# Patient Record
Sex: FEMALE | Race: WHITE | HISPANIC OR LATINO | Employment: FULL TIME | URBAN - METROPOLITAN AREA
[De-identification: names, ages, dates, MRNs, and addresses within clinical notes are randomized per-mention and may not be internally consistent; named-entity substitution may affect disease eponyms.]

---

## 2023-03-18 LAB
CHLAMYDIA TRACH., AMPLIFIED: NEGATIVE
N. GONORRHEA, AMPLIFIED: NEGATIVE

## 2023-03-19 LAB — URINE CULTURE: ABNORMAL

## 2023-04-03 LAB
ABO GROUP (TYPE) IN BLOOD: NORMAL
ANTIBODY SCREEN: NORMAL
ERYTHROCYTE DISTRIBUTION WIDTH (RATIO) BY AUTOMATED COUNT: 13 % (ref 11.5–14.5)
ERYTHROCYTE MEAN CORPUSCULAR HEMOGLOBIN CONCENTRATION (G/DL) BY AUTOMATED: 33 G/DL (ref 32–36)
ERYTHROCYTE MEAN CORPUSCULAR VOLUME (FL) BY AUTOMATED COUNT: 90 FL (ref 80–100)
ERYTHROCYTES (10*6/UL) IN BLOOD BY AUTOMATED COUNT: 4.19 X10E12/L (ref 4–5.2)
ESTIMATED AVERAGE GLUCOSE FOR HBA1C: 94 MG/DL
HEMATOCRIT (%) IN BLOOD BY AUTOMATED COUNT: 37.6 % (ref 36–46)
HEMOGLOBIN (G/DL) IN BLOOD: 12.4 G/DL (ref 12–16)
HEMOGLOBIN A1C/HEMOGLOBIN TOTAL IN BLOOD: 4.9 %
HEPATITIS B VIRUS SURFACE AG PRESENCE IN SERUM: NONREACTIVE
HEPATITIS C VIRUS AB PRESENCE IN SERUM: NONREACTIVE
HIV 1/ 2 AG/AB SCREEN: NONREACTIVE
LEUKOCYTES (10*3/UL) IN BLOOD BY AUTOMATED COUNT: 6.7 X10E9/L (ref 4.4–11.3)
NRBC (PER 100 WBCS) BY AUTOMATED COUNT: 0 /100 WBC (ref 0–0)
PLATELETS (10*3/UL) IN BLOOD AUTOMATED COUNT: 234 X10E9/L (ref 150–450)
REFLEX ADDED, ANEMIA PANEL: NORMAL
RH FACTOR: NORMAL
RUBELLA VIRUS IGG AB: POSITIVE
SYPHILIS TOTAL AB: NONREACTIVE

## 2023-04-05 LAB
HEMOGLOBIN A2: 2.9 %
HEMOGLOBIN A: 96.5 %
HEMOGLOBIN F: 0.6 %
HEMOGLOBIN IDENTIFICATION INTERPRETATION: NORMAL
PATH REVIEW-HGB IDENTIFICATION: NORMAL

## 2023-04-23 LAB — URINE CULTURE: NORMAL

## 2023-08-29 ENCOUNTER — LAB (OUTPATIENT)
Dept: LAB | Facility: LAB | Age: 28
End: 2023-08-29
Payer: COMMERCIAL

## 2023-08-29 LAB
ERYTHROCYTE DISTRIBUTION WIDTH (RATIO) BY AUTOMATED COUNT: 13.1 % (ref 11.5–14.5)
ERYTHROCYTE MEAN CORPUSCULAR HEMOGLOBIN CONCENTRATION (G/DL) BY AUTOMATED: 32 G/DL (ref 32–36)
ERYTHROCYTE MEAN CORPUSCULAR VOLUME (FL) BY AUTOMATED COUNT: 90 FL (ref 80–100)
ERYTHROCYTES (10*6/UL) IN BLOOD BY AUTOMATED COUNT: 3.54 X10E12/L (ref 4–5.2)
FERRITIN, PREGNANCY: 24 UG/L
FOLATE, SERUM, PREGNANCY: 14.6 NG/ML
GLUCOSE, 1 HR SCREEN, PREG: 139 MG/DL
HEMATOCRIT (%) IN BLOOD BY AUTOMATED COUNT: 31.9 % (ref 36–46)
HEMOGLOBIN (G/DL) IN BLOOD: 10.2 G/DL (ref 12–16)
IRON (UG/DL) IN SER/PLAS IN PREGNANCY: 41 UG/DL
IRON BINDING CAPACITY (UG/DL) IN PREGNANCY: 360 UG/DL
IRON SATURATION (%) IN PREGNANCY: 11 %
LEUKOCYTES (10*3/UL) IN BLOOD BY AUTOMATED COUNT: 10.4 X10E9/L (ref 4.4–11.3)
NRBC (PER 100 WBCS) BY AUTOMATED COUNT: 0 /100 WBC (ref 0–0)
PLATELETS (10*3/UL) IN BLOOD AUTOMATED COUNT: 226 X10E9/L (ref 150–450)
REFLEX ADDED, ANEMIA PANEL: ABNORMAL
SYPHILIS TOTAL AB: NONREACTIVE
VITAMIN B12, PREGNANCY: 274 PG/ML

## 2023-09-08 LAB
GLUCOSE THREE HOUR: 93 MG/DL
GLUCOSE TWO HOUR: 127 MG/DL
GLUCOSE, FASTING: 67 MG/DL
GLUCOSE, ONE HOUR: 145 MG/DL
GTTCM: NORMAL

## 2023-09-26 LAB
ERYTHROCYTE DISTRIBUTION WIDTH (RATIO) BY AUTOMATED COUNT: 14 % (ref 11.5–14.5)
ERYTHROCYTE MEAN CORPUSCULAR HEMOGLOBIN CONCENTRATION (G/DL) BY AUTOMATED: 32 G/DL (ref 32–36)
ERYTHROCYTE MEAN CORPUSCULAR VOLUME (FL) BY AUTOMATED COUNT: 92 FL (ref 80–100)
ERYTHROCYTES (10*6/UL) IN BLOOD BY AUTOMATED COUNT: 3.56 X10E12/L (ref 4–5.2)
FOLATE, SERUM, PREGNANCY: >24 NG/ML
HEMATOCRIT (%) IN BLOOD BY AUTOMATED COUNT: 32.8 % (ref 36–46)
HEMOGLOBIN (G/DL) IN BLOOD: 10.5 G/DL (ref 12–16)
HEMOGLOBIN (PG) IN RETICULOCYTES: 32 PG (ref 28–38)
IMMATURE RETIC FRACTION: 16.7 % (ref 0–16)
IRON (UG/DL) IN SER/PLAS IN PREGNANCY: 76 UG/DL
IRON BINDING CAPACITY (UG/DL) IN PREGNANCY: 388 UG/DL
IRON SATURATION (%) IN PREGNANCY: 20 %
LEUKOCYTES (10*3/UL) IN BLOOD BY AUTOMATED COUNT: 8.5 X10E9/L (ref 4.4–11.3)
NRBC (PER 100 WBCS) BY AUTOMATED COUNT: 0 /100 WBC (ref 0–0)
PLATELETS (10*3/UL) IN BLOOD AUTOMATED COUNT: 219 X10E9/L (ref 150–450)
REFLEX ADDED, ANEMIA PANEL: ABNORMAL
RETICULOCYTES (10*3/UL) IN BLOOD: 0.1 X10E12/L (ref 0.02–0.08)
RETICULOCYTES/100 ERYTHROCYTES IN BLOOD BY AUTOMATED COUNT: 2.8 % (ref 0.5–2)
VITAMIN B12, PREGNANCY: 366 PG/ML

## 2023-09-27 PROBLEM — R06.00 DYSPNEA: Status: ACTIVE | Noted: 2023-09-27

## 2023-09-27 PROBLEM — R12 HEARTBURN IN PREGNANCY (HHS-HCC): Status: ACTIVE | Noted: 2023-09-27

## 2023-09-27 PROBLEM — O99.013 ANEMIA DURING PREGNANCY IN THIRD TRIMESTER (HHS-HCC): Status: ACTIVE | Noted: 2023-09-27

## 2023-09-27 PROBLEM — F41.1 ANXIETY, GENERALIZED: Status: ACTIVE | Noted: 2023-09-27

## 2023-09-27 PROBLEM — Z34.03 PRIMIGRAVIDA, THIRD TRIMESTER (HHS-HCC): Status: ACTIVE | Noted: 2023-09-27

## 2023-09-27 PROBLEM — E66.9 CLASS 2 OBESITY WITHOUT SERIOUS COMORBIDITY WITH BODY MASS INDEX (BMI) OF 36.0 TO 36.9 IN ADULT: Status: ACTIVE | Noted: 2023-09-27

## 2023-09-27 PROBLEM — S06.0XAA BRAIN CONCUSSION: Status: ACTIVE | Noted: 2023-09-27

## 2023-09-27 PROBLEM — E66.812 CLASS 2 OBESITY WITHOUT SERIOUS COMORBIDITY WITH BODY MASS INDEX (BMI) OF 36.0 TO 36.9 IN ADULT: Status: ACTIVE | Noted: 2023-09-27

## 2023-09-27 PROBLEM — O26.899 HEARTBURN IN PREGNANCY (HHS-HCC): Status: ACTIVE | Noted: 2023-09-27

## 2023-09-27 PROBLEM — O21.9 NAUSEA AND VOMITING DURING PREGNANCY (HHS-HCC): Status: ACTIVE | Noted: 2023-09-27

## 2023-09-27 LAB
FERRITIN (UG/LL) IN SER/PLAS: 27 UG/L (ref 8–150)
FERRITIN, PREGNANCY: 27 UG/L

## 2023-09-27 RX ORDER — FOLIC ACID, .BETA.-CAROTENE, ASCORBIC ACID, CHOLECALCIFEROL, .ALPHA.-TOCOPHEROL ACETATE, DL-, THIAMINE MONONITRATE, RIBOFLAVIN, NIACINAMIDE, PYRIDOXINE HYDROCHLORIDE, CYANOCOBALAMIN, CALCIUM PANTOTHENATE, CALCIUM CARBONATE, FERROUS FUMARATE, AND ZINC OXIDE 1; 1000; 100; 400; 30; 3; 3; 15; 20; 12; 7; 200; 29; 20 MG/1; [IU]/1; MG/1; [IU]/1; [IU]/1; MG/1; MG/1; MG/1; MG/1; UG/1; MG/1; MG/1; MG/1; MG/1
1 TABLET, CHEWABLE ORAL DAILY
COMMUNITY
Start: 2023-03-17

## 2023-09-27 RX ORDER — DROSPIRENONE AND ETHINYL ESTRADIOL 0.02-3(28)
1 KIT ORAL DAILY
Status: ON HOLD | COMMUNITY
Start: 2021-06-17 | End: 2023-10-13 | Stop reason: ALTCHOICE

## 2023-09-27 RX ORDER — METOCLOPRAMIDE 5 MG/1
1 TABLET ORAL 3 TIMES DAILY PRN
Status: ON HOLD | COMMUNITY
End: 2023-10-13 | Stop reason: ALTCHOICE

## 2023-09-27 RX ORDER — BUTALBITAL, ACETAMINOPHEN AND CAFFEINE 300; 40; 50 MG/1; MG/1; MG/1
1 CAPSULE ORAL EVERY 4 HOURS PRN
Status: ON HOLD | COMMUNITY
Start: 2016-02-06 | End: 2023-10-13 | Stop reason: ALTCHOICE

## 2023-09-27 RX ORDER — ALBUTEROL SULFATE 90 UG/1
1-2 AEROSOL, METERED RESPIRATORY (INHALATION)
COMMUNITY
Start: 2020-12-23

## 2023-09-27 RX ORDER — FAMOTIDINE 20 MG/1
1 TABLET, FILM COATED ORAL NIGHTLY
Status: ON HOLD | COMMUNITY
Start: 2023-08-29 | End: 2023-10-13 | Stop reason: ALTCHOICE

## 2023-09-27 RX ORDER — CALCIUM CARBONATE 300MG(750)
1 TABLET,CHEWABLE ORAL DAILY
Status: ON HOLD | COMMUNITY
Start: 2021-10-07 | End: 2023-10-13 | Stop reason: ALTCHOICE

## 2023-09-27 RX ORDER — MUPIROCIN 20 MG/G
OINTMENT TOPICAL
Status: ON HOLD | COMMUNITY
Start: 2020-09-15 | End: 2023-10-13 | Stop reason: ALTCHOICE

## 2023-09-27 RX ORDER — FLUOXETINE HYDROCHLORIDE 20 MG/1
20 CAPSULE ORAL DAILY
COMMUNITY

## 2023-09-27 RX ORDER — FLUOXETINE 10 MG/1
1 TABLET ORAL DAILY
Status: ON HOLD | COMMUNITY
Start: 2023-04-10 | End: 2023-10-13 | Stop reason: ALTCHOICE

## 2023-09-28 LAB — GROUP B STREP SCREEN: NORMAL

## 2023-10-02 ENCOUNTER — ROUTINE PRENATAL (OUTPATIENT)
Dept: OBSTETRICS AND GYNECOLOGY | Facility: CLINIC | Age: 28
End: 2023-10-02
Payer: COMMERCIAL

## 2023-10-02 VITALS — SYSTOLIC BLOOD PRESSURE: 112 MMHG | WEIGHT: 226 LBS | BODY MASS INDEX: 42.35 KG/M2 | DIASTOLIC BLOOD PRESSURE: 64 MMHG

## 2023-10-02 DIAGNOSIS — Z3A.36 36 WEEKS GESTATION OF PREGNANCY (HHS-HCC): Primary | ICD-10-CM

## 2023-10-02 DIAGNOSIS — O99.013 ANEMIA DURING PREGNANCY IN THIRD TRIMESTER (HHS-HCC): ICD-10-CM

## 2023-10-02 DIAGNOSIS — F41.1 ANXIETY, GENERALIZED: ICD-10-CM

## 2023-10-02 PROCEDURE — 0501F PRENATAL FLOW SHEET: CPT | Performed by: ADVANCED PRACTICE MIDWIFE

## 2023-10-02 NOTE — PROGRESS NOTES
Assessment/Plan   Diagnoses and all orders for this visit:  36 weeks gestation of pregnancy  Obesity complicating childbirth  Anemia during pregnancy in third trimester  Anxiety, generalized    Discussed routine GBS screening, to be completed next visit   surveillance weekly for Obesity Class II.  Growth US tomorrow, will have NST next week.   Reviewed s/sx of PTL, warning signs, fetal movement counts, and when to call provider  Reviewed neg GBS  Follow up in 1 week for a routine prenatal visit.    Fidelia Diggs, JUAN-CNM, APRN-CNP    Gavino     Amy Hogue is a 27 y.o.  at 36w6d with a working estimated date of delivery of 10/24/2023, by Last Menstrual Period who presents for a routine prenatal visit. She denies vaginal bleeding, leakage of fluid, decreased fetal movements, or contractions.    Her pregnancy is complicated by:  Pregnancy Problems (from 23 to present)       No problems associated with this episode.             Objective   Physical Exam:   Weight: 103 kg (226 lb)  Expected Total Weight Gain: Could not be calculated   Pregravid BMI: Could not be calculated  BP: 112/64  Fetal Heart Rate: 146 Fundal Height (cm): 37 cm Presentation: Vertex           Postpartum Depression: Not on file        Prenatal Labs  Lab Results   Component Value Date    HGB 10.5 (L) 2023    HCT 32.8 (L) 2023    HEPBSAG NONREACTIVE 2023

## 2023-10-03 ENCOUNTER — ANCILLARY PROCEDURE (OUTPATIENT)
Dept: RADIOLOGY | Facility: CLINIC | Age: 28
End: 2023-10-03
Payer: COMMERCIAL

## 2023-10-03 ENCOUNTER — APPOINTMENT (OUTPATIENT)
Dept: RADIOLOGY | Facility: CLINIC | Age: 28
End: 2023-10-03
Payer: COMMERCIAL

## 2023-10-03 DIAGNOSIS — Z32.01 PREGNANCY TEST POSITIVE (HHS-HCC): ICD-10-CM

## 2023-10-03 PROCEDURE — 76819 FETAL BIOPHYS PROFIL W/O NST: CPT | Performed by: OBSTETRICS & GYNECOLOGY

## 2023-10-03 PROCEDURE — 76819 FETAL BIOPHYS PROFIL W/O NST: CPT

## 2023-10-03 PROCEDURE — 76816 OB US FOLLOW-UP PER FETUS: CPT

## 2023-10-03 PROCEDURE — 76816 OB US FOLLOW-UP PER FETUS: CPT | Performed by: OBSTETRICS & GYNECOLOGY

## 2023-10-09 ENCOUNTER — ROUTINE PRENATAL (OUTPATIENT)
Dept: OBSTETRICS AND GYNECOLOGY | Facility: CLINIC | Age: 28
End: 2023-10-09
Payer: COMMERCIAL

## 2023-10-09 VITALS — SYSTOLIC BLOOD PRESSURE: 118 MMHG | DIASTOLIC BLOOD PRESSURE: 70 MMHG | WEIGHT: 225 LBS | BODY MASS INDEX: 42.17 KG/M2

## 2023-10-09 DIAGNOSIS — O99.013 ANEMIA DURING PREGNANCY IN THIRD TRIMESTER (HHS-HCC): ICD-10-CM

## 2023-10-09 DIAGNOSIS — Z34.03 PRIMIGRAVIDA, THIRD TRIMESTER (HHS-HCC): Primary | ICD-10-CM

## 2023-10-09 DIAGNOSIS — E66.9 OBESITY, CLASS II, BMI 35-39.9: ICD-10-CM

## 2023-10-09 DIAGNOSIS — O24.414 INSULIN CONTROLLED GESTATIONAL DIABETES MELLITUS (GDM) IN THIRD TRIMESTER (HHS-HCC): ICD-10-CM

## 2023-10-09 PROBLEM — S06.0XAA BRAIN CONCUSSION: Status: RESOLVED | Noted: 2023-09-27 | Resolved: 2023-10-09

## 2023-10-09 PROBLEM — R06.00 DYSPNEA: Status: RESOLVED | Noted: 2023-09-27 | Resolved: 2023-10-09

## 2023-10-09 PROCEDURE — 59025 FETAL NON-STRESS TEST: CPT | Performed by: ADVANCED PRACTICE MIDWIFE

## 2023-10-09 PROCEDURE — 0501F PRENATAL FLOW SHEET: CPT | Performed by: ADVANCED PRACTICE MIDWIFE

## 2023-10-09 NOTE — PROCEDURES
Fetal nonstress test    Date/Time: 10/9/2023 4:37 PM    Performed by: ANDRE Nam, APRN-CNP  Authorized by: ANDRE Nam, APRN-CNP    NST for Class II obesity   Baseline 150 moderate variability +accel, -decels   TOCO: quiet  Reactive NST

## 2023-10-09 NOTE — PROGRESS NOTES
Assessment/Plan   Diagnoses and all orders for this visit:  Primigravida, third trimester  -Reviewed neg GBS  Anemia during pregnancy in third trimester  -on PO iron, appropriate increase in hgb and retic   Obesity, Class II, BMI 35-39.9  -NST reactive today, continue weekly  testing      Reviewed s/sx of labor, warning signs, fetal movement counts, and when to call provider  Follow up in 1 week for a routine prenatal visit.    Fidelia Diggs, JUAN-CNM, APRN-CNP    Gavino Reyes Lennie is a 27 y.o.  at 37w6d with a working estimated date of delivery of 10/24/2023, by Last Menstrual Period who presents for a routine prenatal visit. She denies vaginal bleeding, leakage of fluid, decreased fetal movements, or contractions.      Objective   Physical Exam:   Weight: 102 kg (225 lb)  Expected Total Weight Gain: Could not be calculated   Pregravid BMI: Could not be calculated  BP: 118/70                Prenatal Labs  Lab Results   Component Value Date    HGB 10.5 (L) 2023    HCT 32.8 (L) 2023    HEPBSAG NONREACTIVE 2023

## 2023-10-13 ENCOUNTER — ANESTHESIA (OUTPATIENT)
Dept: OBSTETRICS AND GYNECOLOGY | Facility: HOSPITAL | Age: 28
End: 2023-10-13
Payer: COMMERCIAL

## 2023-10-13 ENCOUNTER — ANESTHESIA EVENT (OUTPATIENT)
Dept: OBSTETRICS AND GYNECOLOGY | Facility: HOSPITAL | Age: 28
End: 2023-10-13
Payer: COMMERCIAL

## 2023-10-13 ENCOUNTER — HOSPITAL ENCOUNTER (INPATIENT)
Facility: HOSPITAL | Age: 28
LOS: 3 days | Discharge: HOME | End: 2023-10-16
Attending: STUDENT IN AN ORGANIZED HEALTH CARE EDUCATION/TRAINING PROGRAM | Admitting: ADVANCED PRACTICE MIDWIFE
Payer: COMMERCIAL

## 2023-10-13 PROBLEM — O42.90 RUPTURED, MEMBRANES, PREMATURE (HHS-HCC): Status: ACTIVE | Noted: 2023-10-13

## 2023-10-13 LAB
ABO GROUP (TYPE) IN BLOOD: NORMAL
ANTIBODY SCREEN: NORMAL
ERYTHROCYTE [DISTWIDTH] IN BLOOD BY AUTOMATED COUNT: 13.7 % (ref 11.5–14.5)
HCT VFR BLD AUTO: 33.2 % (ref 36–46)
HGB BLD-MCNC: 10.8 G/DL (ref 12–16)
MCH RBC QN AUTO: 29 PG (ref 26–34)
MCHC RBC AUTO-ENTMCNC: 32.5 G/DL (ref 32–36)
MCV RBC AUTO: 89 FL (ref 80–100)
NRBC BLD-RTO: 0 /100 WBCS (ref 0–0)
PLATELET # BLD AUTO: 203 X10*3/UL (ref 150–450)
PMV BLD AUTO: 10.7 FL (ref 7.5–11.5)
RBC # BLD AUTO: 3.72 X10*6/UL (ref 4–5.2)
RH FACTOR (ANTIGEN D): NORMAL
T PALLIDUM AB SER QL: NONREACTIVE
WBC # BLD AUTO: 8.5 X10*3/UL (ref 4.4–11.3)

## 2023-10-13 PROCEDURE — 86780 TREPONEMA PALLIDUM: CPT | Mod: CMCLAB | Performed by: ADVANCED PRACTICE MIDWIFE

## 2023-10-13 PROCEDURE — 59025 FETAL NON-STRESS TEST: CPT | Performed by: ADVANCED PRACTICE MIDWIFE

## 2023-10-13 PROCEDURE — 86900 BLOOD TYPING SEROLOGIC ABO: CPT | Performed by: ADVANCED PRACTICE MIDWIFE

## 2023-10-13 PROCEDURE — 96372 THER/PROPH/DIAG INJ SC/IM: CPT | Performed by: STUDENT IN AN ORGANIZED HEALTH CARE EDUCATION/TRAINING PROGRAM

## 2023-10-13 PROCEDURE — 2500000005 HC RX 250 GENERAL PHARMACY W/O HCPCS: Performed by: STUDENT IN AN ORGANIZED HEALTH CARE EDUCATION/TRAINING PROGRAM

## 2023-10-13 PROCEDURE — 85027 COMPLETE CBC AUTOMATED: CPT | Performed by: ADVANCED PRACTICE MIDWIFE

## 2023-10-13 PROCEDURE — 86901 BLOOD TYPING SEROLOGIC RH(D): CPT | Performed by: ADVANCED PRACTICE MIDWIFE

## 2023-10-13 PROCEDURE — 1120000001 HC OB PRIVATE ROOM DAILY

## 2023-10-13 PROCEDURE — 36415 COLL VENOUS BLD VENIPUNCTURE: CPT | Mod: CMCLAB | Performed by: ADVANCED PRACTICE MIDWIFE

## 2023-10-13 PROCEDURE — 2500000001 HC RX 250 WO HCPCS SELF ADMINISTERED DRUGS (ALT 637 FOR MEDICARE OP): Performed by: ADVANCED PRACTICE MIDWIFE

## 2023-10-13 PROCEDURE — 2580000001 HC RX 258 IV SOLUTIONS: Performed by: ADVANCED PRACTICE MIDWIFE

## 2023-10-13 PROCEDURE — 36415 COLL VENOUS BLD VENIPUNCTURE: CPT | Performed by: ADVANCED PRACTICE MIDWIFE

## 2023-10-13 RX ORDER — METOCLOPRAMIDE 10 MG/1
10 TABLET ORAL EVERY 6 HOURS PRN
Status: DISCONTINUED | OUTPATIENT
Start: 2023-10-13 | End: 2023-10-14

## 2023-10-13 RX ORDER — MISOPROSTOL 200 UG/1
800 TABLET ORAL ONCE AS NEEDED
Status: DISCONTINUED | OUTPATIENT
Start: 2023-10-13 | End: 2023-10-14

## 2023-10-13 RX ORDER — NIFEDIPINE 10 MG/1
10 CAPSULE ORAL ONCE AS NEEDED
Status: DISCONTINUED | OUTPATIENT
Start: 2023-10-13 | End: 2023-10-14

## 2023-10-13 RX ORDER — FENTANYL/BUPIVACAINE/NS/PF 2MCG/ML-.1
PLASTIC BAG, INJECTION (ML) INJECTION AS NEEDED
Status: DISCONTINUED | OUTPATIENT
Start: 2023-10-13 | End: 2023-10-14 | Stop reason: HOSPADM

## 2023-10-13 RX ORDER — METOCLOPRAMIDE HYDROCHLORIDE 5 MG/ML
10 INJECTION INTRAMUSCULAR; INTRAVENOUS EVERY 6 HOURS PRN
Status: DISCONTINUED | OUTPATIENT
Start: 2023-10-13 | End: 2023-10-14

## 2023-10-13 RX ORDER — LOPERAMIDE HYDROCHLORIDE 2 MG/1
4 CAPSULE ORAL EVERY 2 HOUR PRN
Status: DISCONTINUED | OUTPATIENT
Start: 2023-10-13 | End: 2023-10-14

## 2023-10-13 RX ORDER — LABETALOL HYDROCHLORIDE 5 MG/ML
20 INJECTION, SOLUTION INTRAVENOUS ONCE AS NEEDED
Status: DISCONTINUED | OUTPATIENT
Start: 2023-10-13 | End: 2023-10-14

## 2023-10-13 RX ORDER — FLUOXETINE HYDROCHLORIDE 20 MG/1
20 CAPSULE ORAL DAILY
Status: DISCONTINUED | OUTPATIENT
Start: 2023-10-13 | End: 2023-10-14

## 2023-10-13 RX ORDER — NORETHINDRONE AND ETHINYL ESTRADIOL 0.5-0.035
KIT ORAL AS NEEDED
Status: DISCONTINUED | OUTPATIENT
Start: 2023-10-13 | End: 2023-10-14 | Stop reason: HOSPADM

## 2023-10-13 RX ORDER — CARBOPROST TROMETHAMINE 250 UG/ML
250 INJECTION, SOLUTION INTRAMUSCULAR ONCE AS NEEDED
Status: DISCONTINUED | OUTPATIENT
Start: 2023-10-13 | End: 2023-10-14

## 2023-10-13 RX ORDER — OXYTOCIN/0.9 % SODIUM CHLORIDE 30/500 ML
60 PLASTIC BAG, INJECTION (ML) INTRAVENOUS
Status: DISCONTINUED | OUTPATIENT
Start: 2023-10-13 | End: 2023-10-14

## 2023-10-13 RX ORDER — LIDOCAINE HYDROCHLORIDE 10 MG/ML
30 INJECTION INFILTRATION; PERINEURAL ONCE AS NEEDED
Status: DISCONTINUED | OUTPATIENT
Start: 2023-10-13 | End: 2023-10-14

## 2023-10-13 RX ORDER — ONDANSETRON 4 MG/1
4 TABLET, FILM COATED ORAL EVERY 6 HOURS PRN
Status: DISCONTINUED | OUTPATIENT
Start: 2023-10-13 | End: 2023-10-14

## 2023-10-13 RX ORDER — ALBUTEROL SULFATE 90 UG/1
1-2 AEROSOL, METERED RESPIRATORY (INHALATION) EVERY 6 HOURS PRN
Status: DISCONTINUED | OUTPATIENT
Start: 2023-10-13 | End: 2023-10-14

## 2023-10-13 RX ORDER — HYDRALAZINE HYDROCHLORIDE 20 MG/ML
5 INJECTION INTRAMUSCULAR; INTRAVENOUS ONCE AS NEEDED
Status: DISCONTINUED | OUTPATIENT
Start: 2023-10-13 | End: 2023-10-14

## 2023-10-13 RX ORDER — OXYTOCIN 10 [USP'U]/ML
10 INJECTION, SOLUTION INTRAMUSCULAR; INTRAVENOUS ONCE AS NEEDED
Status: DISCONTINUED | OUTPATIENT
Start: 2023-10-13 | End: 2023-10-14

## 2023-10-13 RX ORDER — ONDANSETRON HYDROCHLORIDE 2 MG/ML
4 INJECTION, SOLUTION INTRAVENOUS EVERY 6 HOURS PRN
Status: DISCONTINUED | OUTPATIENT
Start: 2023-10-13 | End: 2023-10-14

## 2023-10-13 RX ORDER — SODIUM CHLORIDE, SODIUM LACTATE, POTASSIUM CHLORIDE, CALCIUM CHLORIDE 600; 310; 30; 20 MG/100ML; MG/100ML; MG/100ML; MG/100ML
125 INJECTION, SOLUTION INTRAVENOUS CONTINUOUS
Status: DISCONTINUED | OUTPATIENT
Start: 2023-10-13 | End: 2023-10-14

## 2023-10-13 RX ORDER — METHYLERGONOVINE MALEATE 0.2 MG/ML
0.2 INJECTION INTRAVENOUS ONCE AS NEEDED
Status: DISCONTINUED | OUTPATIENT
Start: 2023-10-13 | End: 2023-10-14

## 2023-10-13 RX ORDER — OXYTOCIN/0.9 % SODIUM CHLORIDE 30/500 ML
2-30 PLASTIC BAG, INJECTION (ML) INTRAVENOUS CONTINUOUS
Status: DISCONTINUED | OUTPATIENT
Start: 2023-10-13 | End: 2023-10-14

## 2023-10-13 RX ORDER — TERBUTALINE SULFATE 1 MG/ML
0.25 INJECTION SUBCUTANEOUS ONCE AS NEEDED
Status: DISCONTINUED | OUTPATIENT
Start: 2023-10-13 | End: 2023-10-14

## 2023-10-13 RX ORDER — PHENYLEPHRINE HCL IN 0.9% NACL 0.4MG/10ML
SYRINGE (ML) INTRAVENOUS AS NEEDED
Status: DISCONTINUED | OUTPATIENT
Start: 2023-10-13 | End: 2023-10-14 | Stop reason: HOSPADM

## 2023-10-13 RX ORDER — TRANEXAMIC ACID 100 MG/ML
1000 INJECTION, SOLUTION INTRAVENOUS ONCE AS NEEDED
Status: DISCONTINUED | OUTPATIENT
Start: 2023-10-13 | End: 2023-10-14

## 2023-10-13 RX ADMIN — Medication 5 ML: at 22:54

## 2023-10-13 RX ADMIN — Medication 120 MCG: at 23:04

## 2023-10-13 RX ADMIN — Medication 120 MCG: at 23:08

## 2023-10-13 RX ADMIN — MISOPROSTOL 25 MCG: 100 TABLET ORAL at 17:00

## 2023-10-13 RX ADMIN — MISOPROSTOL 25 MCG: 100 TABLET ORAL at 19:15

## 2023-10-13 RX ADMIN — Medication 14 ML/HR: at 23:10

## 2023-10-13 RX ADMIN — NORETHINDRONE AND ETHINYL ESTRADIOL 25 MG: KIT ORAL at 23:51

## 2023-10-13 RX ADMIN — SODIUM CHLORIDE, POTASSIUM CHLORIDE, SODIUM LACTATE AND CALCIUM CHLORIDE 125 ML/HR: 600; 310; 30; 20 INJECTION, SOLUTION INTRAVENOUS at 21:52

## 2023-10-13 RX ADMIN — EPHEDRINE SULFATE 25 MG: 50 INJECTION, SOLUTION INTRAVENOUS at 23:52

## 2023-10-13 RX ADMIN — SODIUM CHLORIDE, POTASSIUM CHLORIDE, SODIUM LACTATE AND CALCIUM CHLORIDE 500 ML: 600; 310; 30; 20 INJECTION, SOLUTION INTRAVENOUS at 21:52

## 2023-10-13 RX ADMIN — MISOPROSTOL 25 MCG: 100 TABLET ORAL at 14:52

## 2023-10-13 RX ADMIN — Medication 160 MCG: at 23:14

## 2023-10-13 RX ADMIN — Medication 5 ML: at 22:57

## 2023-10-13 RX ADMIN — Medication 240 MCG: at 23:25

## 2023-10-13 RX ADMIN — Medication 160 MCG: at 23:19

## 2023-10-13 SDOH — SOCIAL STABILITY: SOCIAL INSECURITY: VERBAL ABUSE: DENIES

## 2023-10-13 SDOH — ECONOMIC STABILITY: HOUSING INSECURITY: DO YOU FEEL UNSAFE GOING BACK TO THE PLACE WHERE YOU ARE LIVING?: NO

## 2023-10-13 SDOH — HEALTH STABILITY: MENTAL HEALTH: CURRENT SMOKER: 0

## 2023-10-13 SDOH — HEALTH STABILITY: MENTAL HEALTH: WERE YOU ABLE TO COMPLETE ALL THE BEHAVIORAL HEALTH SCREENINGS?: YES

## 2023-10-13 SDOH — HEALTH STABILITY: MENTAL HEALTH: WISH TO BE DEAD (PAST 1 MONTH): NO

## 2023-10-13 SDOH — SOCIAL STABILITY: SOCIAL INSECURITY: DO YOU FEEL ANYONE HAS EXPLOITED OR TAKEN ADVANTAGE OF YOU FINANCIALLY OR OF YOUR PERSONAL PROPERTY?: NO

## 2023-10-13 SDOH — SOCIAL STABILITY: SOCIAL INSECURITY: ABUSE SCREEN: ADULT

## 2023-10-13 SDOH — SOCIAL STABILITY: SOCIAL INSECURITY: PHYSICAL ABUSE: DENIES

## 2023-10-13 SDOH — SOCIAL STABILITY: SOCIAL INSECURITY: HAS ANYONE EVER THREATENED TO HURT YOUR FAMILY OR YOUR PETS?: NO

## 2023-10-13 SDOH — SOCIAL STABILITY: SOCIAL INSECURITY: ARE YOU OR HAVE YOU BEEN THREATENED OR ABUSED PHYSICALLY, EMOTIONALLY, OR SEXUALLY BY ANYONE?: NO

## 2023-10-13 SDOH — HEALTH STABILITY: MENTAL HEALTH: NON-SPECIFIC ACTIVE SUICIDAL THOUGHTS (PAST 1 MONTH): NO

## 2023-10-13 SDOH — HEALTH STABILITY: MENTAL HEALTH: STRENGTHS (MUST CHOOSE TWO): SENSE OF HUMOR;SUPPORT FROM FAMILY

## 2023-10-13 SDOH — SOCIAL STABILITY: SOCIAL INSECURITY: ARE THERE ANY APPARENT SIGNS OF INJURIES/BEHAVIORS THAT COULD BE RELATED TO ABUSE/NEGLECT?: NO

## 2023-10-13 SDOH — SOCIAL STABILITY: SOCIAL INSECURITY: HAVE YOU HAD THOUGHTS OF HARMING ANYONE ELSE?: NO

## 2023-10-13 SDOH — HEALTH STABILITY: MENTAL HEALTH: HAVE YOU USED ANY PRESCRIPTION DRUGS OTHER THAN PRESCRIBED IN THE PAST 12 MONTHS?: NO

## 2023-10-13 SDOH — HEALTH STABILITY: MENTAL HEALTH: HAVE YOU USED ANY SUBSTANCES (CANABIS, COCAINE, HEROIN, HALLUCINOGENS, INHALANTS, ETC.) IN THE PAST 12 MONTHS?: NO

## 2023-10-13 SDOH — HEALTH STABILITY: MENTAL HEALTH: SUICIDAL BEHAVIOR (LIFETIME): NO

## 2023-10-13 SDOH — SOCIAL STABILITY: SOCIAL INSECURITY: DOES ANYONE TRY TO KEEP YOU FROM HAVING/CONTACTING OTHER FRIENDS OR DOING THINGS OUTSIDE YOUR HOME?: NO

## 2023-10-13 ASSESSMENT — ACTIVITIES OF DAILY LIVING (ADL)
JUDGMENT_ADEQUATE_SAFELY_COMPLETE_DAILY_ACTIVITIES: YES
HEARING - RIGHT EAR: FUNCTIONAL
PATIENT'S MEMORY ADEQUATE TO SAFELY COMPLETE DAILY ACTIVITIES?: YES
HEARING - LEFT EAR: FUNCTIONAL
GROOMING: INDEPENDENT
DRESSING YOURSELF: INDEPENDENT
FEEDING YOURSELF: INDEPENDENT
ADEQUATE_TO_COMPLETE_ADL: YES
BATHING: INDEPENDENT
LACK_OF_TRANSPORTATION: NO
LACK_OF_TRANSPORTATION: NO
WALKS IN HOME: INDEPENDENT
TOILETING: INDEPENDENT

## 2023-10-13 ASSESSMENT — LIFESTYLE VARIABLES
AUDIT-C TOTAL SCORE: -1
SKIP TO QUESTIONS 9-10: 0
AUDIT-C TOTAL SCORE: -1
AUDIT-C TOTAL SCORE: 0
SKIP TO QUESTIONS 9-10: 1
HOW OFTEN DO YOU HAVE A DRINK CONTAINING ALCOHOL: NEVER
HOW OFTEN DO YOU HAVE A DRINK CONTAINING ALCOHOL: NEVER
HOW MANY STANDARD DRINKS CONTAINING ALCOHOL DO YOU HAVE ON A TYPICAL DAY: PATIENT DECLINED
HOW MANY STANDARD DRINKS CONTAINING ALCOHOL DO YOU HAVE ON A TYPICAL DAY: PATIENT DOES NOT DRINK
HOW OFTEN DO YOU HAVE 6 OR MORE DRINKS ON ONE OCCASION: NEVER
HOW OFTEN DO YOU HAVE 6 OR MORE DRINKS ON ONE OCCASION: PATIENT DECLINED
AUDIT-C TOTAL SCORE: 0

## 2023-10-13 ASSESSMENT — PATIENT HEALTH QUESTIONNAIRE - PHQ9
SUM OF ALL RESPONSES TO PHQ9 QUESTIONS 1 & 2: 0
SUM OF ALL RESPONSES TO PHQ9 QUESTIONS 1 & 2: 0
2. FEELING DOWN, DEPRESSED OR HOPELESS: NOT AT ALL
1. LITTLE INTEREST OR PLEASURE IN DOING THINGS: NOT AT ALL
1. LITTLE INTEREST OR PLEASURE IN DOING THINGS: NOT AT ALL
2. FEELING DOWN, DEPRESSED OR HOPELESS: NOT AT ALL

## 2023-10-13 ASSESSMENT — PAIN SCALES - GENERAL
PAINLEVEL_OUTOF10: 0 - NO PAIN
PAINLEVEL_OUTOF10: 3

## 2023-10-13 NOTE — H&P
Obstetrical Admission History and Physical     Amy Hogue is a 27 y.o.  at 38w3d. DONNA: 10/24/2023, by Last Menstrual Period. Estimated fetal weight: 7#3oz. She has had prenatal care with Fidelia Diggs CNM .    Chief Complaint: Contractions and Leakage/Loss of Fluid (Q6-7 minutes)    Assessment/Plan     Ruptured Membranes at term with unfavorable cervix         Plan for buccal cytotec   Neg GBS     Obesity - Class II     Anxiety on Meds (Prozac 20mg daily)         Continue Prozac daily     Anemia - stable @ 10.5mg/dl     Elevated 1hr, normal 3hr    7.    Cat I fetal tracing    Dr. Carias aware of admission and agrees with plan for cervical ripening.     Active Problems:  There are no active Hospital Problems.      Pregnancy Problems (from 21 to present)       No problems associated with this episode.          Options for delivery have been discussed with the patient and she elects a vaginal delivery.  Labor has been discussed in detail. The risks, benefits, complications, alternatives, expected outcomes, potential problems during recuperation and recovery, and the risks of not performing the procedure were discussed with the patient. The patient stated understanding that the risks of delivery include, but are not limited to: death; reaction to medications; injury to bowel, bladder, ureters, uterus, cervix, vagina, and other pelvic and abdominal structures, infection; blood loss and possible need for transfusion; and potential need for surgery, including hysterectomy. The risks of injury to the infant during delivery were also discussed. All questions were answered. The patient is wishing to continue with plans for a vaginal delivery.    Admit to inpatient status. I anticipate that this patient will require a stay exceeding at least 2 midnights for delivery and postpartum.  Active management of rupture of membranes.  Management of pregnancy complications, as indicated.    Gavino Reyes presents to  Labor & Delivery with Spontaneous Rupture of Membranes of Clear fluid at 0600 hr on 10/13/23. Good fetal movement. Having contractions q 6-7 minutes.            Obstetrical History   OB History    Para Term  AB Living   1 0 0 0 0 0   SAB IAB Ectopic Multiple Live Births   0 0 0 0 0      # Outcome Date GA Lbr Jan/2nd Weight Sex Delivery Anes PTL Lv   1 Current                Past Medical History  Past Medical History:   Diagnosis Date    Anemia in pregnancy     Anxiety     COVID-19 2020    COVID-19    Personal history of diseases of the skin and subcutaneous tissue     History of psoriasis    Personal history of other endocrine, nutritional and metabolic disease     History of obesity    Personal history of other infectious and parasitic diseases     History of varicella    Personal history of other mental and behavioral disorders     History of depression        Past Surgical History   History reviewed. No pertinent surgical history.    Social History  Social History     Tobacco Use    Smoking status: Never    Smokeless tobacco: Never   Substance Use Topics    Alcohol use: Not Currently     Substance and Sexual Activity   Drug Use Never       Allergies  Patient has no known allergies.     Medications  Medications Prior to Admission   Medication Sig Dispense Refill Last Dose    albuterol 90 mcg/actuation inhaler Inhale 1-2 puffs. Every 4-6 hours as needed       butalbital-acetaminophen-caff (Fioricet) -40 mg capsule Take 1 capsule by mouth every 4 hours if needed.       doxylamine (Unisom, doxylamine,) 25 mg tablet Take 0.5 tablets (12.5 mg) by mouth once daily at bedtime.       drospirenone-ethinyl estradioL (Meche, Gianvi) 3-0.02 mg tablet Take 1 tablet by mouth once daily.       famotidine (Pepcid) 20 mg tablet Take 1 tablet (20 mg) by mouth once daily at bedtime.       FLUoxetine (PROzac) 10 mg tablet Take 1 tablet (10 mg) by mouth once daily. As directed   10/12/2023 at 1500    FLUoxetine  (PROzac) 20 mg capsule Take 1 capsule (20 mg) by mouth once daily.       ginger, Zingiber officinalis, 250 mg capsule Take by mouth. Take as directed       magnesium oxide (Mag-Ox) 400 mg tablet Take 1 tablet (400 mg) by mouth once daily.       metoclopramide (Reglan) 5 mg tablet Take 1 tablet (5 mg) by mouth 3 times a day as needed. N/V       mupirocin (Bactroban) 2 % ointment APPLY A SMALL AMOUNT TO THE AFFECTED AREA BY TOPICAL ROUTE 3 TIMES PER DAY       prenatal no115-iron-folic acid (Prenatal 19) 29 mg iron- 1 mg tablet,chewable Chew 1 tablet once daily.   10/12/2023 at 2200    vit B complex 100 combo no.2 (B-100 COMPLEX ORAL) Take 1 tablet by mouth once daily.          Objective    Last Vitals  Temp Pulse Resp BP MAP O2 Sat   36.5 °C (97.7 °F) 80 18 103/55 74 mmHg 96 %     Physical Examination  GENERAL: Examination reveals a well developed, well nourished, gravid female in no acute distress. She is alert and cooperative.  FHR is 135 Mod variability , with Accelerations, Variable decelerations, and a 1  tracing.    VAGINA: normal appearing vagina with normal color and discharge and no lesions noted.  Cervix 1/50/-2  SKIN: normal coloration and turgor, no rashes  NEUROLOGICAL: DTRs normal and symmetrical  PSYCHOLOGICAL: awake and alert; oriented to person, place, and time      Lab Review  Labs in chart were reviewed.

## 2023-10-13 NOTE — ANESTHESIA PREPROCEDURE EVALUATION
Patient: Amy Hogue    Procedure Information    Date: 10/13/23  Procedure: Labor Analgesia         Relevant Problems   Anesthesia (within normal limits)      Cardiovascular (within normal limits)      Endocrine (within normal limits)      /Renal (within normal limits)      Neuro/Psych   (+) Anxiety, generalized      GI/Hepatic (within normal limits)      Hematology   (+) Anemia during pregnancy in third trimester      Musculoskeletal (within normal limits)       Clinical information reviewed:   Tobacco  Allergies  Meds   Med Hx  Surg Hx   Fam Hx          NPO/Void Status  Date of Last Liquid: 10/13/23  Time of Last Liquid: 1000  Date of Last Solid: 10/13/23  Time of Last Solid: 0600           Past Medical History:   Diagnosis Date    Anemia in pregnancy     Anxiety     COVID-19 12/23/2020    COVID-19    Personal history of diseases of the skin and subcutaneous tissue     History of psoriasis    Personal history of other endocrine, nutritional and metabolic disease     History of obesity    Personal history of other infectious and parasitic diseases     History of varicella    Personal history of other mental and behavioral disorders     History of depression      History reviewed. No pertinent surgical history.  Social History     Tobacco Use    Smoking status: Never    Smokeless tobacco: Never   Substance Use Topics    Alcohol use: Not Currently    Drug use: Never      Current Outpatient Medications   Medication Instructions    albuterol 90 mcg/actuation inhaler 1-2 puffs, inhalation, Every 4-6 hours as needed    FLUoxetine (PROZAC) 20 mg, oral, Daily    prenatal no115-iron-folic acid (Prenatal 19) 29 mg iron- 1 mg tablet,chewable 1 tablet, oral, Daily      No Known Allergies     Chemistry    Lab Results   Component Value Date/Time     01/28/2021 1049    K 4.4 01/28/2021 1049     01/28/2021 1049    CO2 26 01/28/2021 1049    BUN 16 01/28/2021 1049    CREATININE 0.85 01/28/2021 1049    Lab  "Results   Component Value Date/Time    CALCIUM 9.6 01/28/2021 1049    ALKPHOS 68 01/28/2021 1049    AST 21 01/28/2021 1049    ALT 21 01/28/2021 1049    BILITOT 0.5 01/28/2021 1049          Lab Results   Component Value Date/Time    WBC 8.5 09/26/2023 1543    HGB 10.5 (L) 09/26/2023 1543    HCT 32.8 (L) 09/26/2023 1543     09/26/2023 1543     No results found for: \"PROTIME\", \"PTT\", \"INR\"  No results found for this or any previous visit (from the past 4464 hour(s)).  No results found for this or any previous visit from the past 1095 days.       Visit Vitals  /55   Pulse 82   Temp 36.6 °C (97.9 °F) (Temporal)   Resp 18   Ht 1.549 m (5' 1\")   Wt 101 kg (223 lb 12.3 oz)   LMP 01/17/2023   SpO2 98%   BMI 42.28 kg/m²   OB Status Pregnant   Smoking Status Never   BSA 2.09 m²        Anesthesia Evaluation     Patient summary reviewed    Airway   Mallampati: II  TM distance: >3 FB  Neck ROM: full  Dental      Pulmonary - negative ROS and normal exam   Cardiovascular - negative ROS    Rhythm: regular  Rate: normal    Neuro/Psych      GI/Hepatic/Renal - negative ROS     Endo/Other - negative ROS   Abdominal                       Physical Exam    Airway  Mallampati: II  TM distance: >3 FB  Neck ROM: full     Cardiovascular   Rhythm: regular  Rate: normal     Dental    Pulmonary - normal exam     Abdominal             Anesthesia Plan    ASA 2     epidural, general and CSE   (Explained backup plans of CSE and GETA)  The patient is not a current smoker.    Anesthetic plan and risks discussed with patient.    Plan discussed with resident and attending.        "

## 2023-10-13 NOTE — PROGRESS NOTES
Assessment    27 y.o.  at 38w3d  FHT Category 1  Latent labor  GBS neg   Unfavorable cervix     Plan    Recommended 25 mcg cyto #1 for cervical ripening, plan for further augmentation with pit when appropriate   Encourage frequent position changes as tolerated  Encourage ambulation as tolerated  Maternal repositioning  Continue assessment of maternal and fetal wellbeing  Recheck as clinically indicated by maternal or fetal status    Alondra La, JUAN-JESÚS    Subjective:  Amy Hogue is 26 yo  IUP @ 38w3d by LMP     Objective:  Fetal Monitoring      Baseline FHR: 140 per minute  Variability: moderate  Accelerations: yes  Decelerations: none  TOCO: Irregular     Cervical Exam:  deferred, admission SVE 1/50/-3  / mid/med  Bishops score: 4     Membrane status: intact    Pitocin is at none mu/min.    Vitals:    10/13/23 1439 10/13/23 1444 10/13/23 1449 10/13/23 1454   BP:       Pulse: 101 97 99 98   Resp:       Temp:       TempSrc:       SpO2: 96% 96% 97% 96%   Weight:       Height:

## 2023-10-14 ENCOUNTER — ANESTHESIA EVENT (OUTPATIENT)
Dept: OBSTETRICS AND GYNECOLOGY | Facility: CLINIC | Age: 28
End: 2023-10-14

## 2023-10-14 PROCEDURE — 2580000001 HC RX 258 IV SOLUTIONS: Performed by: ADVANCED PRACTICE MIDWIFE

## 2023-10-14 PROCEDURE — 2500000004 HC RX 250 GENERAL PHARMACY W/ HCPCS (ALT 636 FOR OP/ED): Performed by: ADVANCED PRACTICE MIDWIFE

## 2023-10-14 PROCEDURE — 2500000001 HC RX 250 WO HCPCS SELF ADMINISTERED DRUGS (ALT 637 FOR MEDICARE OP): Performed by: DOULA

## 2023-10-14 PROCEDURE — 4A1H7CZ MONITORING OF PRODUCTS OF CONCEPTION, CARDIAC RATE, VIA NATURAL OR ARTIFICIAL OPENING: ICD-10-PCS | Performed by: ADVANCED PRACTICE MIDWIFE

## 2023-10-14 PROCEDURE — 1100000001 HC PRIVATE ROOM DAILY

## 2023-10-14 PROCEDURE — 59410 OBSTETRICAL CARE: CPT | Performed by: DOULA

## 2023-10-14 PROCEDURE — 10H07YZ INSERTION OF OTHER DEVICE INTO PRODUCTS OF CONCEPTION, VIA NATURAL OR ARTIFICIAL OPENING: ICD-10-PCS | Performed by: ADVANCED PRACTICE MIDWIFE

## 2023-10-14 PROCEDURE — 10H073Z INSERTION OF MONITORING ELECTRODE INTO PRODUCTS OF CONCEPTION, VIA NATURAL OR ARTIFICIAL OPENING: ICD-10-PCS | Performed by: ADVANCED PRACTICE MIDWIFE

## 2023-10-14 RX ORDER — ACETAMINOPHEN 325 MG/1
975 TABLET ORAL EVERY 6 HOURS SCHEDULED
Status: DISCONTINUED | OUTPATIENT
Start: 2023-10-14 | End: 2023-10-14

## 2023-10-14 RX ORDER — HYDRALAZINE HYDROCHLORIDE 20 MG/ML
5 INJECTION INTRAMUSCULAR; INTRAVENOUS ONCE AS NEEDED
Status: DISCONTINUED | OUTPATIENT
Start: 2023-10-14 | End: 2023-10-16 | Stop reason: HOSPADM

## 2023-10-14 RX ORDER — LABETALOL HYDROCHLORIDE 5 MG/ML
20 INJECTION, SOLUTION INTRAVENOUS ONCE AS NEEDED
Status: DISCONTINUED | OUTPATIENT
Start: 2023-10-14 | End: 2023-10-16 | Stop reason: HOSPADM

## 2023-10-14 RX ORDER — DIPHENHYDRAMINE HYDROCHLORIDE 50 MG/ML
25 INJECTION INTRAMUSCULAR; INTRAVENOUS EVERY 6 HOURS PRN
Status: DISCONTINUED | OUTPATIENT
Start: 2023-10-14 | End: 2023-10-16 | Stop reason: HOSPADM

## 2023-10-14 RX ORDER — MISOPROSTOL 200 UG/1
800 TABLET ORAL ONCE AS NEEDED
Status: DISCONTINUED | OUTPATIENT
Start: 2023-10-14 | End: 2023-10-16 | Stop reason: HOSPADM

## 2023-10-14 RX ORDER — DIPHENHYDRAMINE HYDROCHLORIDE 50 MG/ML
25 INJECTION INTRAMUSCULAR; INTRAVENOUS ONCE
Status: COMPLETED | OUTPATIENT
Start: 2023-10-14 | End: 2023-10-14

## 2023-10-14 RX ORDER — LIDOCAINE 560 MG/1
1 PATCH PERCUTANEOUS; TOPICAL; TRANSDERMAL
Status: DISCONTINUED | OUTPATIENT
Start: 2023-10-14 | End: 2023-10-16 | Stop reason: HOSPADM

## 2023-10-14 RX ORDER — CARBOPROST TROMETHAMINE 250 UG/ML
250 INJECTION, SOLUTION INTRAMUSCULAR ONCE AS NEEDED
Status: DISCONTINUED | OUTPATIENT
Start: 2023-10-14 | End: 2023-10-16 | Stop reason: HOSPADM

## 2023-10-14 RX ORDER — NIFEDIPINE 10 MG/1
10 CAPSULE ORAL ONCE AS NEEDED
Status: DISCONTINUED | OUTPATIENT
Start: 2023-10-14 | End: 2023-10-16 | Stop reason: HOSPADM

## 2023-10-14 RX ORDER — DIPHENHYDRAMINE HCL 25 MG
25 CAPSULE ORAL EVERY 6 HOURS PRN
Status: DISCONTINUED | OUTPATIENT
Start: 2023-10-14 | End: 2023-10-16 | Stop reason: HOSPADM

## 2023-10-14 RX ORDER — OXYTOCIN 10 [USP'U]/ML
10 INJECTION, SOLUTION INTRAMUSCULAR; INTRAVENOUS ONCE AS NEEDED
Status: DISCONTINUED | OUTPATIENT
Start: 2023-10-14 | End: 2023-10-16 | Stop reason: HOSPADM

## 2023-10-14 RX ORDER — ONDANSETRON 4 MG/1
4 TABLET, FILM COATED ORAL EVERY 6 HOURS PRN
Status: DISCONTINUED | OUTPATIENT
Start: 2023-10-14 | End: 2023-10-16 | Stop reason: HOSPADM

## 2023-10-14 RX ORDER — ONDANSETRON HYDROCHLORIDE 2 MG/ML
4 INJECTION, SOLUTION INTRAVENOUS EVERY 6 HOURS PRN
Status: DISCONTINUED | OUTPATIENT
Start: 2023-10-14 | End: 2023-10-16 | Stop reason: HOSPADM

## 2023-10-14 RX ORDER — OXYTOCIN/0.9 % SODIUM CHLORIDE 30/500 ML
60 PLASTIC BAG, INJECTION (ML) INTRAVENOUS
Status: DISCONTINUED | OUTPATIENT
Start: 2023-10-14 | End: 2023-10-16 | Stop reason: HOSPADM

## 2023-10-14 RX ORDER — LOPERAMIDE HYDROCHLORIDE 2 MG/1
4 CAPSULE ORAL EVERY 2 HOUR PRN
Status: DISCONTINUED | OUTPATIENT
Start: 2023-10-14 | End: 2023-10-16 | Stop reason: HOSPADM

## 2023-10-14 RX ORDER — TRANEXAMIC ACID 100 MG/ML
1000 INJECTION, SOLUTION INTRAVENOUS ONCE AS NEEDED
Status: DISCONTINUED | OUTPATIENT
Start: 2023-10-14 | End: 2023-10-16 | Stop reason: HOSPADM

## 2023-10-14 RX ORDER — SIMETHICONE 80 MG
80 TABLET,CHEWABLE ORAL 4 TIMES DAILY PRN
Status: DISCONTINUED | OUTPATIENT
Start: 2023-10-14 | End: 2023-10-16 | Stop reason: HOSPADM

## 2023-10-14 RX ORDER — ADHESIVE BANDAGE
10 BANDAGE TOPICAL
Status: DISCONTINUED | OUTPATIENT
Start: 2023-10-14 | End: 2023-10-16 | Stop reason: HOSPADM

## 2023-10-14 RX ORDER — IBUPROFEN 600 MG/1
600 TABLET ORAL EVERY 6 HOURS SCHEDULED
Status: DISCONTINUED | OUTPATIENT
Start: 2023-10-14 | End: 2023-10-16 | Stop reason: HOSPADM

## 2023-10-14 RX ORDER — ACETAMINOPHEN 325 MG/1
975 TABLET ORAL EVERY 6 HOURS SCHEDULED
Status: DISCONTINUED | OUTPATIENT
Start: 2023-10-14 | End: 2023-10-16 | Stop reason: HOSPADM

## 2023-10-14 RX ORDER — BISACODYL 10 MG/1
10 SUPPOSITORY RECTAL DAILY PRN
Status: DISCONTINUED | OUTPATIENT
Start: 2023-10-14 | End: 2023-10-16 | Stop reason: HOSPADM

## 2023-10-14 RX ORDER — POLYETHYLENE GLYCOL 3350 17 G/17G
17 POWDER, FOR SOLUTION ORAL 2 TIMES DAILY PRN
Status: DISCONTINUED | OUTPATIENT
Start: 2023-10-14 | End: 2023-10-16 | Stop reason: HOSPADM

## 2023-10-14 RX ORDER — IBUPROFEN 600 MG/1
600 TABLET ORAL EVERY 6 HOURS SCHEDULED
Status: DISCONTINUED | OUTPATIENT
Start: 2023-10-14 | End: 2023-10-14

## 2023-10-14 RX ORDER — METHYLERGONOVINE MALEATE 0.2 MG/ML
0.2 INJECTION INTRAVENOUS ONCE AS NEEDED
Status: DISCONTINUED | OUTPATIENT
Start: 2023-10-14 | End: 2023-10-16 | Stop reason: HOSPADM

## 2023-10-14 RX ADMIN — DIPHENHYDRAMINE HYDROCHLORIDE 25 MG: 50 INJECTION INTRAMUSCULAR; INTRAVENOUS at 05:12

## 2023-10-14 RX ADMIN — IBUPROFEN 600 MG: 600 TABLET ORAL at 23:43

## 2023-10-14 RX ADMIN — IBUPROFEN 600 MG: 600 TABLET ORAL at 17:11

## 2023-10-14 RX ADMIN — ACETAMINOPHEN 975 MG: 325 TABLET ORAL at 23:43

## 2023-10-14 RX ADMIN — SODIUM CHLORIDE, POTASSIUM CHLORIDE, SODIUM LACTATE AND CALCIUM CHLORIDE 125 ML/HR: 600; 310; 30; 20 INJECTION, SOLUTION INTRAVENOUS at 03:31

## 2023-10-14 RX ADMIN — Medication 2 MILLI-UNITS/MIN: at 02:57

## 2023-10-14 RX ADMIN — ACETAMINOPHEN 975 MG: 325 TABLET ORAL at 17:11

## 2023-10-14 RX ADMIN — SODIUM CHLORIDE, POTASSIUM CHLORIDE, SODIUM LACTATE AND CALCIUM CHLORIDE 125 ML/HR: 600; 310; 30; 20 INJECTION, SOLUTION INTRAVENOUS at 07:00

## 2023-10-14 ASSESSMENT — PAIN SCALES - GENERAL
PAINLEVEL_OUTOF10: 4
PAINLEVEL_OUTOF10: 5 - MODERATE PAIN
PAINLEVEL_OUTOF10: 0 - NO PAIN
PAINLEVEL_OUTOF10: 0 - NO PAIN
PAINLEVEL_OUTOF10: 3
PAINLEVEL_OUTOF10: 3
PAINLEVEL_OUTOF10: 0 - NO PAIN
PAINLEVEL_OUTOF10: 0 - NO PAIN

## 2023-10-14 ASSESSMENT — PAIN DESCRIPTION - DESCRIPTORS: DESCRIPTORS: CRAMPING

## 2023-10-14 NOTE — PROGRESS NOTES
Pt comfortable with epidural    Fht currently 170, mod lupis, +accels, non-recurrent variable decels       After epidural fh and b/p changes prompted meds to correct       Currently there is audible frequent and strong fetal movement  Maternal temp 36.4  [place]: very difficult to read. Currently appears about q4-5  Last ve at 2300 2/90/-2    Iup at 38.4  Rom x19hrs  S/p buccal cyto x3, no pit yet  Fht category 2, overall reassuring for variability, accels and fetal movement  Gbs neg    Continue expectant management  Ok to start pit when fht allows per protocol  Dr. Partida aware of status    chalo Bentley

## 2023-10-14 NOTE — PROGRESS NOTES
Intrapartum Progress Note    Assessment   27 y.o.  at 38w3d  Prom x14h. Cytotec buccal for cervical ripening; dose #3 given at 1915  FHT Category 1  Gbs negative    Plan   Continue cervical ripening and avoidance of multiple ve's  Pitocin when pt becomes more uncomfortable  Cefm  Pain meds per pt preference  Anticipate         Subjective   Assuming care of this 26yo  at 38.3  Pt reports minimal cramping      Objective   Last Vitals:  Temp Pulse Resp BP MAP Pulse Ox   36.6 °C (97.9 °F) 100 15 125/77 74 mmHg 97 %     Physical Examination:  , mod lupis, +accels, rare variable decels  [place]: irreg  VE: def'd

## 2023-10-14 NOTE — PROGRESS NOTES
Pt comfortable with epidural    Fht 150, mod lupis, +accels, +non-recurrent variable decels since epidural--still working on intrauterine resus  [place]: q3  Ve: /-2    Iup at 38.3  Prom x17hrs  S/P cyto x3  Fht cat 2, overall reassuring for variability and accels  Gbs neg    Start pit per protocol  Cefm; intrauterine resus as indicated  Anticipate     Sheree, tosinm

## 2023-10-14 NOTE — ADDENDUM NOTE
Addendum  created 10/14/23 0513 by Kasi Curiel MD    Intraprocedure Event edited, Intraprocedure Meds edited

## 2023-10-14 NOTE — PROGRESS NOTES
Labor Progress Note    Patient is 27 y.o.  at 38w4d here for IOL for PROM (since 0600 on 10/13). Discussed plan of care with Mariya ESPINOSA, in addition to patient/family. FHR baseline 165-170, but with moderate variability and accels. She continues to be afebrile with no fundal tenderness, therefore do not suspect chorioamnionitis at this time. She is at risk for this as rupture occurred ~20 hours ago. Pt 2cm on exam at 0200, which was unchanged, but forebag was ruptured on exam.     Due to overall reassuring tracing, discussed it is reasonable to continue IOL. Would recommend starting pitocin IV and continuing titration until in a regular contraction pattern. Reviewed indications for  section, including failed induction and NRFHT.      All questions from family and patient answered.     Modesta Field MD

## 2023-10-14 NOTE — PROGRESS NOTES
Comfortable    Fht 160, mod lupis, +accels, +non-recurrent variable decels  Iupc q2-3, mvu's <150  Ve def'd    Iup at 38.4  PROM x24h  Latent labor on pit  Gbs negative    Continue current course  Anticipate     Sheree, chalo

## 2023-10-14 NOTE — PROGRESS NOTES
"S: Presented bedside after RN patty'oscar noting that pt is reporting rectal pressure     O:   Cervical Exam: /  FHR     Baseline: 165      Variability: mod     Accels: present      Decels: occasional variable <60x60      Category: II   TOCO: q 3min   Oxytocin: 6 mU/min   Membrane status: SROM @ 0600 10/13     Color of fluid: bloody    /75   Pulse (!) 113   Temp 36.7 °C (98.1 °F) (Oral)   Resp 16   Ht 1.549 m (5' 1\")   Wt 101 kg (223 lb 12.3 oz)   LMP 2023   SpO2 (!) 94%   BMI 42.28 kg/m²      A: 26 yo  IUP @ 38w4d by LMP   Cat II - overall reassuring given moderate variability and accels   Active labor   Fetal tachycardia   Maternal tachycardia       P:   CEFM   Intrauterine resuscitation measures initiated pt moved to throne position   Assessed pt temp given s/p SROM 24 hours maternal tachycardia and fetal tachycardia. Temp wnl, will monitor closely   Initiated fluid bolus   Continue assessment of maternal and fetal well-being  Anticipate JOEY La, APRLILLY-MARYM    "

## 2023-10-14 NOTE — ADDENDUM NOTE
Addendum  created 10/14/23 0701 by Lucius Coronado MD    Cosign clinical note, Intraprocedure Staff edited

## 2023-10-14 NOTE — DISCHARGE INSTRUCTIONS

## 2023-10-14 NOTE — CARE PLAN
Problem: Postpartum  Goal: Experiences normal postpartum course  Outcome: Progressing  Goal: Appropriate maternal -  bonding  Outcome: Progressing  Goal: Establish and maintain infant feeding pattern for adequate nutrition  Outcome: Progressing  Goal: Incisions, wounds, or drain sites healing without S/S of infection  Outcome: Progressing  Goal: No s/sx infection  Outcome: Progressing  Goal: No s/sx of hemorrhage  Outcome: Progressing  Goal: Minimal s/sx of HDP and BP<160/110  Outcome: Progressing     Problem: Pain - Adult  Goal: Verbalizes/displays adequate comfort level or baseline comfort level  Outcome: Progressing

## 2023-10-14 NOTE — PROGRESS NOTES
RN vocera'oscar CNM noting that she saw meconium on pad, presented bedside to assess. Pt found to have thin meconium on pad. Explained to patient that given presence of meconium, peds would be at delivery. Pt still endorsing rectal pressure

## 2023-10-14 NOTE — CARE PLAN
The patient's goals for the shift include pt will demonstarte labor coping techniques through delivery    The clinical goals for the shift include pt will verbalize understanding personal risk factors for falls and will not fall this shift    Patient did progress towards goals

## 2023-10-14 NOTE — ADDENDUM NOTE
Addendum  created 10/14/23 0522 by Kasi Curiel MD    Clinical Note Signed, Intraprocedure Blocks edited, Intraprocedure Event deleted, Intraprocedure Event edited, Intraprocedure Staff edited

## 2023-10-14 NOTE — PROGRESS NOTES
Pt comfortable  In the room per RN request to review strip    Fht 175, mod lupis, +accels, +non-recurrent variable decels  [place] 3-5  Ve unchanged at 2/90/-2    Iup at 38.4  Rom x20hrs, s/p cyto x3, no pit yet  Fht cat 2, overall reassuring for variability and accels  Gbs neg    D/W dr. Field who will talk with pt and family and consider pit start

## 2023-10-14 NOTE — L&D DELIVERY NOTE
OB Delivery Note  10/14/2023  Amy Lennie  27 y.o.     Gestational Age: 38w4d  /Para:   Estimated Blood Loss:   Delivery Blood Loss  10/14/23 0400 - 10/14/23 1133      None            Quantitative Blood Loss: Admission to Discharge: 0 mL (10/13/2023 11:11 AM - 10/14/2023 11:33 AM)    Nena Hogue [[id number]]      Labor Events    Rupture date/time: 10/13/2023 0600  Rupture type: Spontaneous  Fluid color: Clear, Meconium  Fluid odor: None  Labor type: Induced Onset of Labor  Labor allowed to proceed with plans for an attempted vaginal birth?: Yes  Induction: Misoprostol, Oxytocin  Induction indications: Premature ROM  Complications: None  Additional complications: Fetal tachycardia affecting management of mother, Tachycardia determined by examination of pulse       Labor Event Times    Labor onset date/time: 10/14/2023 1051  Dilation complete date/time: 10/14/2023 1000  Start pushing date/time: 10/14/2023 0945       Placenta    Placenta delivery date/time: 10/14/2023 1103  Placenta removal: Spontaneous  Placenta appearance: Intact  Placenta disposition: lab       Cord    Vessels: 3 vessels  Complications: None  Delayed cord clamping?: Yes  Cord clamped date/time: 10/14/2023 1054  Cord blood disposition: Discarded  Gases sent?: No  Stem cell collection (by provider): No       Lacerations    Episiotomy: None  Perineal laceration: 1st  Perineal laceration repaired?: No  Other lacerations?: No  Repair suture: None       Anesthesia    Method: Epidural       Operative Delivery    Forceps attempted?: No  Vacuum extractor attempted?: No        Delivery    Birth date/time: 10/14/2025 10:51:00  Delivery type: Vaginal, Spontaneous  Complications: None       Resuscitation    Method: Tactile stimulation       Apgars    Living status: Living  Apgar Component Scores:  1 min.:  5 min.:  10 min.:  15 min.:  20 min.:    Skin color:  2  2       Heart rate:  2  2       Reflex irritability:  1  2       Muscle  tone:  1  2       Respiratory effort:  0  1       Total:  6  9              Delivery Providers    Delivering clinician: ANDRE Sauer   Provider Role    Kalen Barrett RN Delivery Nurse    Lesli Whitfield RN Nursery Nurse     Resident               Hanny Hogue [[id number]]      Labor Events    Rupture date/time: 10/13/2023 0600  Rupture type: Spontaneous  Fluid color: Clear, Meconium  Fluid odor: None       Labor Event Times    Labor onset date/time: 10/14/2023 0400  Dilation complete date/time: 10/14/2023 1000       Placenta    Placenta delivery date/time:   Placenta removal:        Lacerations    Episiotomy: None  Perineal laceration: 1st  Perineal laceration repaired?: No  Other lacerations?: No  Repair suture: None        Delivery    Birth date/time:   Delivery type:        Apgars    Living status:   Apgar Component Scores:  1 min.:  5 min.:  10 min.:  15 min.:  20 min.:    Skin color:         Heart rate:         Reflex irritability:         Muscle tone:         Respiratory effort:         Total:                Delivery Providers    Delivering clinician:    Provider Role     Delivery Nurse     Nursery Nurse     Resident                 Pt pushed with good effort to deliver infant head in RICKIE position and restituted to ROT, shoulders then delivered with ease. Infant was then stimulated and brought skin to skin. Placenta delivered with gentle traction. Uterus firm to massage, lower uterine segment was boggy, therefore a lower uterine sweep was conducted, and expressed three clots, uterus then found to be firm and midline.  mL. On inspection pt was found to have a 1st degree perineal laceration that was hemostatic and did not require repair.     ANDRE Bowser

## 2023-10-14 NOTE — ANESTHESIA PROCEDURE NOTES
Epidural Block    Patient location during procedure: OB  Start time: 10/13/2023 10:39 PM  End time: 10/13/2023 11:14 PM  Reason for block: labor analgesia  Staffing  Performed: resident   Authorized by: Kasi Curiel MD    Performed by: Kasi Curiel MD    Preanesthetic Checklist  Completed: patient identified, IV checked, risks and benefits discussed, surgical consent, pre-op evaluation, timeout performed and sterile techniques followed  Block Timeout  RN/Licensed healthcare professional reads aloud to the Anesthesia provider and entire team: Patient identity, procedure with side and site, patient position, and as applicable the availability of implants/special equipment/special requirements.  Patient on coagulant treatment: no  Timeout performed at: 10/13/2023 10:41 PM  Block Placement  Patient position: sitting  Prep: ChloraPrep  Sterility prep: drape  Sedation level: no sedation  Patient monitoring: heart rate, continuous pulse oximetry and blood pressure  Approach: midline  Local numbing: lidocaine 1% to skin and subcutaneous tissues  Vertebral space: lumbar  Lumbar location: L3-L4  Epidural  Loss of resistance technique: saline  Guidance: landmark technique        Needle  Needle type: Tuohy   Needle gauge: 18  Needle length: 10 cm  Needle insertion depth: 6.5 cm  Catheter size: 19 G  Catheter at skin depth: 12 cm  Catheter securement method: clear occlusive dressing    Test dose: lidocaine 1.5% with epinephrine 1-to-200,000  Test dose given at 10/13/2023 10:53 PM  Test dose: lidocaine 1.5% with epinephrine 1-to-200,000  Test dose result: no positive test dose    PCEA  Medication concentration used: 0.044% Bupivacaine with 1.25 mcg/mL Fentanyl and [id number] Epinephrine  Dose (mL): 10  Lockout (minutes): 15  1-Hour Limit (boluses/hr): 4  Basal Rate: 14        Assessment  Sensory level: T10 bilateral  Block outcome: pain improved  Number of attempts: 1  Events: no positive test dose  Procedure  assessment: patient tolerated procedure well with no immediate complications

## 2023-10-14 NOTE — ADDENDUM NOTE
Addendum  created 10/14/23 0709 by Diego Almazan MD    Attestation recorded in Intraprocedure, Intraprocedure Attestations filed

## 2023-10-14 NOTE — PROGRESS NOTES
Pt feeling rectal pressure, at same time RN called this provider to the room for audible deep decel while unable to trace. Pit off.  VE 5/90/-2  IUPC & FSE placed. Subsequently,    Fht 165, mod lupis with non-recurrent short & shallow variable decels  Iupc q3-4 without adequate mvu's    Will continue to monitor and restart pit when fht allows.  Will update dr. Field when available    chalo Bentley

## 2023-10-15 PROBLEM — O21.9 NAUSEA AND VOMITING DURING PREGNANCY (HHS-HCC): Status: RESOLVED | Noted: 2023-09-27 | Resolved: 2023-10-15

## 2023-10-15 PROBLEM — O42.90 RUPTURED, MEMBRANES, PREMATURE (HHS-HCC): Status: RESOLVED | Noted: 2023-10-13 | Resolved: 2023-10-15

## 2023-10-15 PROBLEM — O99.013 ANEMIA DURING PREGNANCY IN THIRD TRIMESTER (HHS-HCC): Status: RESOLVED | Noted: 2023-09-27 | Resolved: 2023-10-15

## 2023-10-15 PROBLEM — R12 HEARTBURN IN PREGNANCY (HHS-HCC): Status: RESOLVED | Noted: 2023-09-27 | Resolved: 2023-10-15

## 2023-10-15 PROBLEM — O26.899 HEARTBURN IN PREGNANCY (HHS-HCC): Status: RESOLVED | Noted: 2023-09-27 | Resolved: 2023-10-15

## 2023-10-15 PROBLEM — Z34.03 PRIMIGRAVIDA, THIRD TRIMESTER (HHS-HCC): Status: RESOLVED | Noted: 2023-09-27 | Resolved: 2023-10-15

## 2023-10-15 LAB
ABO GROUP (TYPE) IN BLOOD: NORMAL
ANTIBODY SCREEN: NORMAL
ERYTHROCYTE [DISTWIDTH] IN BLOOD BY AUTOMATED COUNT: 13.7 % (ref 11.5–14.5)
HCT VFR BLD AUTO: 31.2 % (ref 36–46)
HGB BLD-MCNC: 10 G/DL (ref 12–16)
MCH RBC QN AUTO: 29.9 PG (ref 26–34)
MCHC RBC AUTO-ENTMCNC: 32.1 G/DL (ref 32–36)
MCV RBC AUTO: 93 FL (ref 80–100)
NRBC BLD-RTO: 0 /100 WBCS (ref 0–0)
PLATELET # BLD AUTO: 182 X10*3/UL (ref 150–450)
PMV BLD AUTO: 10.3 FL (ref 7.5–11.5)
RBC # BLD AUTO: 3.35 X10*6/UL (ref 4–5.2)
RH FACTOR (ANTIGEN D): NORMAL
WBC # BLD AUTO: 10.4 X10*3/UL (ref 4.4–11.3)

## 2023-10-15 PROCEDURE — 86900 BLOOD TYPING SEROLOGIC ABO: CPT | Performed by: DOULA

## 2023-10-15 PROCEDURE — 88307 TISSUE EXAM BY PATHOLOGIST: CPT | Mod: TC,SUR,CMCLAB | Performed by: DOULA

## 2023-10-15 PROCEDURE — 86901 BLOOD TYPING SEROLOGIC RH(D): CPT | Performed by: DOULA

## 2023-10-15 PROCEDURE — 2500000001 HC RX 250 WO HCPCS SELF ADMINISTERED DRUGS (ALT 637 FOR MEDICARE OP): Performed by: DOULA

## 2023-10-15 PROCEDURE — 88307 TISSUE EXAM BY PATHOLOGIST: CPT | Performed by: PATHOLOGY

## 2023-10-15 PROCEDURE — 2500000001 HC RX 250 WO HCPCS SELF ADMINISTERED DRUGS (ALT 637 FOR MEDICARE OP): Performed by: NURSE PRACTITIONER

## 2023-10-15 PROCEDURE — 1100000001 HC PRIVATE ROOM DAILY

## 2023-10-15 PROCEDURE — 85027 COMPLETE CBC AUTOMATED: CPT | Performed by: STUDENT IN AN ORGANIZED HEALTH CARE EDUCATION/TRAINING PROGRAM

## 2023-10-15 RX ORDER — FLUOXETINE 10 MG/1
10 CAPSULE ORAL NIGHTLY
Status: DISCONTINUED | OUTPATIENT
Start: 2023-10-15 | End: 2023-10-16 | Stop reason: HOSPADM

## 2023-10-15 RX ORDER — ALBUTEROL SULFATE 90 UG/1
1-2 AEROSOL, METERED RESPIRATORY (INHALATION) EVERY 6 HOURS PRN
Status: DISCONTINUED | OUTPATIENT
Start: 2023-10-15 | End: 2023-10-16 | Stop reason: HOSPADM

## 2023-10-15 RX ADMIN — IBUPROFEN 600 MG: 600 TABLET ORAL at 06:05

## 2023-10-15 RX ADMIN — ACETAMINOPHEN 975 MG: 325 TABLET ORAL at 18:05

## 2023-10-15 RX ADMIN — ACETAMINOPHEN 975 MG: 325 TABLET ORAL at 06:05

## 2023-10-15 RX ADMIN — IBUPROFEN 600 MG: 600 TABLET ORAL at 18:05

## 2023-10-15 RX ADMIN — FLUOXETINE 10 MG: 10 CAPSULE ORAL at 21:15

## 2023-10-15 RX ADMIN — ACETAMINOPHEN 975 MG: 325 TABLET ORAL at 12:03

## 2023-10-15 RX ADMIN — IBUPROFEN 600 MG: 600 TABLET ORAL at 12:03

## 2023-10-15 ASSESSMENT — PAIN DESCRIPTION - DESCRIPTORS: DESCRIPTORS: CRAMPING

## 2023-10-15 ASSESSMENT — PAIN SCALES - GENERAL
PAINLEVEL_OUTOF10: 3
PAINLEVEL_OUTOF10: 0 - NO PAIN
PAINLEVEL_OUTOF10: 0 - NO PAIN
PAIN_LEVEL: 2
PAINLEVEL_OUTOF10: 0 - NO PAIN

## 2023-10-15 NOTE — CARE PLAN
Patient with stable vital signs and assessment.   Problem: Postpartum  Goal: Experiences normal postpartum course  Outcome: Progressing  Goal: Appropriate maternal -  bonding  Outcome: Progressing  Goal: Minimal s/sx of HDP and BP<160/110  Outcome: Progressing     Problem: Pain - Adult  Goal: Verbalizes/displays adequate comfort level or baseline comfort level  Outcome: Progressing

## 2023-10-15 NOTE — ANESTHESIA POSTPROCEDURE EVALUATION
Anesthesia Postop Evaluation    Patient: Amy Hogue    Procedure Summary       Date: 10/13/23 Room / Location:     Anesthesia Start: 2239 Anesthesia Stop:     Procedure: Labor Analgesia Diagnosis:     Scheduled Providers:  Responsible Provider: Lucius Coronado MD    Anesthesia Type: epidural, general, CSE ASA Status: 2            Anesthesia Type: epidural, general, CSE    Vitals:    10/15/23 1120   BP: 113/77   Pulse: 80   Resp:    Temp: 36.3 °C (97.3 °F)   SpO2: 97%        Anesthesia Post Evaluation    Patient location during evaluation: floor  Patient participation: complete - patient participated  Level of consciousness: awake and alert  Pain score: 2  Pain management: adequate  Airway patency: patent  Cardiovascular status: acceptable and hemodynamically stable  Respiratory status: acceptable and room air  Hydration status: acceptable        No notable events documented.    Pt is awake and able to answer questions.      Pt reports no complications from anesthesia.

## 2023-10-15 NOTE — PROGRESS NOTES
Postpartum Progress Note    Assessment/Plan     Amy Hogue is a 27 y.o., , who had a  on 10/14 at 38w4d and is now postpartum day 1.    #Postpartum  - continue routine postpartum care  - pain well controlled on po medications  - DVT Score: 2 ; for SCDs    #Maternal Well-Being  - emotional support provided  - bonding with infant  - Contraception: Defers    # Feeding  - breastfeeding/pumping encouraged; lactation consult prn    #Dispo  - anticipate d/c on PPD #2 if continuing to meet all postpartum milestones  - for f/u 4-6 weeks with Primary OB provider    Principal Problem:     (normal spontaneous vaginal delivery)  Active Problems:    Anemia of mother in pregnancy, delivered with postpartum condition    Ruptured, membranes, premature      Twin Gestation with Vaginal Delivery   The patient's blood type is A POS. Rhogam is not indicated.    Subjective   Meeting all postpartum milestones- ambulating independently, passing flatus, tolerating PO intake, lochia light, voiding spontaneously, and pain well controlled with PO meds.    Objective   Physical Exam:  General: well appearing, well-nourished, postpartum  Obstetric: abdomen soft, non-tender, fundus firm below umbilicus, lochia light  Skin: Warm, dry; no rashes/lesions/erythema  Breast: No masses, nipple discharge  Neuro: A/Ox3, conversational  GI: +BS  Respiratory: Even and unlabored on RA, LSCTA BL  Cardiovascular: Trace BLE edema; No erythema, warmth, or paraesthesia  Psych: appropriate mood and affect    Last Vitals:  Temp Pulse Resp BP MAP Pulse Ox   36.3 °C (97.3 °F) 80 16 113/77 89 mmHg 97 %     Vitals Min/Max Last 24 Hours:  Temp  Min: 36 °C (96.8 °F)  Max: 36.5 °C (97.7 °F)  Pulse  Min: 65  Max: 88  Resp  Min: 16  Max: 18  BP  Min: 106/71  Max: 123/77  MAP  Min: 83 mmHg  Max: 89 mmHg    Lab Data:  Lab Results   Component Value Date    WBC 10.4 10/15/2023    HGB 10.0 (L) 10/15/2023    HCT 31.2 (L) 10/15/2023     10/15/2023

## 2023-10-16 VITALS
OXYGEN SATURATION: 98 % | HEIGHT: 61 IN | DIASTOLIC BLOOD PRESSURE: 72 MMHG | HEART RATE: 78 BPM | TEMPERATURE: 97.5 F | BODY MASS INDEX: 42.25 KG/M2 | SYSTOLIC BLOOD PRESSURE: 113 MMHG | WEIGHT: 223.77 LBS | RESPIRATION RATE: 18 BRPM

## 2023-10-16 PROCEDURE — 2500000001 HC RX 250 WO HCPCS SELF ADMINISTERED DRUGS (ALT 637 FOR MEDICARE OP): Performed by: DOULA

## 2023-10-16 RX ORDER — ACETAMINOPHEN 500 MG
1000 TABLET ORAL EVERY 6 HOURS PRN
Qty: 120 TABLET | Refills: 0 | Status: SHIPPED | OUTPATIENT
Start: 2023-10-16

## 2023-10-16 RX ORDER — IBUPROFEN 600 MG/1
600 TABLET ORAL EVERY 6 HOURS PRN
Qty: 120 TABLET | Refills: 0 | Status: SHIPPED | OUTPATIENT
Start: 2023-10-16 | End: 2023-11-15

## 2023-10-16 RX ORDER — DOCUSATE SODIUM 100 MG/1
100 CAPSULE, LIQUID FILLED ORAL 2 TIMES DAILY PRN
Qty: 60 CAPSULE | Refills: 0 | Status: SHIPPED | OUTPATIENT
Start: 2023-10-16 | End: 2023-11-15

## 2023-10-16 RX ADMIN — IBUPROFEN 600 MG: 600 TABLET ORAL at 06:09

## 2023-10-16 RX ADMIN — IBUPROFEN 600 MG: 600 TABLET ORAL at 00:00

## 2023-10-16 RX ADMIN — IBUPROFEN 600 MG: 600 TABLET ORAL at 12:01

## 2023-10-16 RX ADMIN — ACETAMINOPHEN 975 MG: 325 TABLET ORAL at 06:09

## 2023-10-16 RX ADMIN — ACETAMINOPHEN 975 MG: 325 TABLET ORAL at 00:05

## 2023-10-16 RX ADMIN — ACETAMINOPHEN 975 MG: 325 TABLET ORAL at 12:02

## 2023-10-16 ASSESSMENT — PAIN SCALES - GENERAL
PAINLEVEL_OUTOF10: 5 - MODERATE PAIN
PAINLEVEL_OUTOF10: 3
PAINLEVEL_OUTOF10: 0 - NO PAIN
PAINLEVEL_OUTOF10: 5 - MODERATE PAIN
PAINLEVEL_OUTOF10: 3
PAINLEVEL_OUTOF10: 1

## 2023-10-16 NOTE — CARE PLAN
Pt has stable vital signs and assessments, pain well controlled, and lochia light and scant. Patient cleared for discharge.

## 2023-10-16 NOTE — DISCHARGE SUMMARY
Discharge Summary    Admission Date: 10/13/2023    Discharge Date: 10/16/23    Discharge Diagnosis   (normal spontaneous vaginal delivery)     Patient Active Problem List   Diagnosis    Anxiety, generalized    Anemia of mother in pregnancy, delivered with postpartum condition    Obesity, Class II, BMI 35-39.9     (normal spontaneous vaginal delivery)       Hospital Course  Admission Date: 10/13/2023    Delivery Date:   10/14/2023   10:51 AM   Delivery type:   Vaginal, Spontaneous       GA at delivery: 38w4d    Outcome:   Living  Anesthesia during delivery:   Epidural  Intrapartum complications:   None  Feeding method: Bottle/formula    Contraception: Defers    Vaginal Delivery   The patient's blood type is A POS. Rhogam is not indicated.    She is now postpartum day 2 and meeting all milestones- voiding spontaneously, passing flatus, ambulating independently, pain well controlled on PO meds, and tolerating PO intake.   Denies CP, SOB, calf pain, fever, passage of large clots.    Pertinent Physical Exam At Time of Discharge  General: well appearing, well-nourished, postpartum  Obstetric: abdomen soft, non-tender, fundus firm below umbilicus, lochia light  Skin: Warm, dry; no rashes/lesions/erythema  Breast: No masses, nipple discharge  Neuro: A/Ox3, conversational  GI: +BS  Respiratory: Even and unlabored on RA, LSCTA BL  Cardiovascular: Trace BLE edema; No erythema, warmth, or paraesthesia  Psych: appropriate mood and affect       Your medication list        START taking these medications        Instructions Last Dose Given Next Dose Due   acetaminophen 500 mg tablet  Commonly known as: Tylenol      Take 2 tablets (1,000 mg) by mouth every 6 hours if needed for moderate pain (4 - 6).       docusate sodium 100 mg capsule  Commonly known as: Colace      Take 1 capsule (100 mg) by mouth 2 times a day as needed for constipation.       ibuprofen 600 mg tablet      Take 1 tablet (600 mg) by mouth every 6 hours if  needed for moderate pain (4 - 6) (pain).              CONTINUE taking these medications        Instructions Last Dose Given Next Dose Due   albuterol 90 mcg/actuation inhaler           FLUoxetine 20 mg capsule  Commonly known as: PROzac           Prenatal 19 29 mg iron- 1 mg tablet,chewable  Generic drug: prenatal no115-iron-folic acid                     Where to Get Your Medications        These medications were sent to [facility] - [place], [place] - [address] [facility]  [address] [facility] [address]      Phone: [phone]   acetaminophen 500 mg tablet  docusate sodium 100 mg capsule  ibuprofen 600 mg tablet           Complications Requiring Follow-Up  None  Outpatient Follow-Up  6 week postpartum follow-up with prenatal provider.  JUAN Lazar-CNP

## 2023-10-17 LAB
LABORATORY COMMENT REPORT: NORMAL
PATH REPORT.FINAL DX SPEC: NORMAL
PATH REPORT.GROSS SPEC: NORMAL
PATH REPORT.RELEVANT HX SPEC: NORMAL
PATH REPORT.TOTAL CANCER: NORMAL

## 2023-10-17 NOTE — SIGNIFICANT EVENT
Follow-up Phone Call Note:   Interview:  Care Type: Women's Health   Phone Number Call  [phone]   Call Outcome: left a message   Date/Time Of Call: 10/17/2023 at 1554   Call Back Done By: care coordinator   Callback Complete: yes

## 2023-10-18 ENCOUNTER — APPOINTMENT (OUTPATIENT)
Dept: OBSTETRICS AND GYNECOLOGY | Facility: CLINIC | Age: 28
End: 2023-10-18
Payer: COMMERCIAL

## 2023-10-18 ENCOUNTER — ANESTHESIA (OUTPATIENT)
Dept: OBSTETRICS AND GYNECOLOGY | Facility: CLINIC | Age: 28
End: 2023-10-18

## 2023-10-23 ENCOUNTER — APPOINTMENT (OUTPATIENT)
Dept: OBSTETRICS AND GYNECOLOGY | Facility: CLINIC | Age: 28
End: 2023-10-23
Payer: COMMERCIAL